# Patient Record
Sex: FEMALE | Race: OTHER | Employment: UNEMPLOYED | URBAN - METROPOLITAN AREA
[De-identification: names, ages, dates, MRNs, and addresses within clinical notes are randomized per-mention and may not be internally consistent; named-entity substitution may affect disease eponyms.]

---

## 2020-07-06 ENCOUNTER — HOSPITAL ENCOUNTER (EMERGENCY)
Age: 30
Discharge: HOME | End: 2020-07-06
Attending: EMERGENCY MEDICINE | Admitting: EMERGENCY MEDICINE
Payer: SELF-PAY

## 2020-07-06 ENCOUNTER — APPOINTMENT (OUTPATIENT)
Dept: GENERAL RADIOLOGY | Age: 30
End: 2020-07-06
Attending: EMERGENCY MEDICINE
Payer: SELF-PAY

## 2020-07-06 VITALS
DIASTOLIC BLOOD PRESSURE: 73 MMHG | SYSTOLIC BLOOD PRESSURE: 121 MMHG | WEIGHT: 162 LBS | HEART RATE: 89 BPM | RESPIRATION RATE: 18 BRPM | OXYGEN SATURATION: 97 % | TEMPERATURE: 98.6 F

## 2020-07-06 DIAGNOSIS — R50.9 ACUTE FEBRILE ILLNESS: Primary | ICD-10-CM

## 2020-07-06 DIAGNOSIS — Z20.822 SUSPECTED COVID-19 VIRUS INFECTION: ICD-10-CM

## 2020-07-06 PROCEDURE — 99282 EMERGENCY DEPT VISIT SF MDM: CPT

## 2020-07-06 PROCEDURE — 71045 X-RAY EXAM CHEST 1 VIEW: CPT

## 2020-07-06 PROCEDURE — 87635 SARS-COV-2 COVID-19 AMP PRB: CPT

## 2020-07-06 PROCEDURE — 74011250637 HC RX REV CODE- 250/637: Performed by: EMERGENCY MEDICINE

## 2020-07-06 RX ORDER — ACETAMINOPHEN 500 MG
1000 TABLET ORAL
Status: COMPLETED | OUTPATIENT
Start: 2020-07-06 | End: 2020-07-06

## 2020-07-06 RX ORDER — ACETAMINOPHEN 500 MG
1000 TABLET ORAL
Qty: 30 TAB | Refills: 0 | Status: SHIPPED | OUTPATIENT
Start: 2020-07-06

## 2020-07-06 RX ORDER — IBUPROFEN 800 MG/1
800 TABLET ORAL
Qty: 30 TAB | Refills: 0 | Status: SHIPPED | OUTPATIENT
Start: 2020-07-06

## 2020-07-06 RX ADMIN — ACETAMINOPHEN 1000 MG: 500 TABLET ORAL at 22:46

## 2020-07-07 ENCOUNTER — PATIENT OUTREACH (OUTPATIENT)
Dept: CASE MANAGEMENT | Age: 30
End: 2020-07-07

## 2020-07-07 NOTE — ED TRIAGE NOTES
[employer]  Used     Patient arrives for complaints of generalized body aches, headache, and cough     Patient took tylenol this morning     Patient unsure if she has had COVID exposure

## 2020-07-07 NOTE — DISCHARGE INSTRUCTIONS
Patient Education        Aprenda sobre el coronavirus ([id number])  Learning About Coronavirus (COVID-19)  Coronavirus ([phone]): Generalidades  ¿Qué es el coronavirus (FESWG-80)? La enfermedad por coronavirus (COVID-19) es causada por un virus. Es bright enfermedad que se detectó por Aloma Callas en [facility], en [place] de 2019. Desde entonces se ha extendido por todo el jennifer. El virus puede causar [place], tos y dificultad para respirar. En casos graves, puede causar neumonía y hacer que sea difícil respirar sin [facility]. Puede causar la muerte. Los coronavirus son un gran rashi de virus. Provocan el resfriado. También causan enfermedades más graves chance el síndrome respiratorio de [place] (MERS, por bela siglas en inglés) y el síndrome respiratorio jolynn grave (SARS, por bela siglas en inglés). COVID-19 es causada por un nuevo coronavirus. [place] significa que es un nuevo tipo que no se había visto antes [facility]. El virus se transmite de persona a persona a través de gotitas por toser y estornudar. También puede transmitirse cuando usted está cerca de bright persona que está infectada. Y puede transmitirse cuando toca algo que contiene el virus, chance el pomo de bright natalia o bright trimble. ¿Qué puede usted hacer para protegerse del coronavirus (PZAWS-69)? La mejor manera de evitar enfermarse es hacer lo siguiente:  · Evitar las áreas en las que haya un brote. · Evitar el contacto con personas que pudieran estar infectadas. · [place] vale a menudo con jabón o desinfectantes de vale a base de alcohol. · [address] y tratar de mantenerse al menos a 6 pies de distancia de otras personas. · [place] vale a menudo, especialmente después de toser o estornudar. Use agua y Cristi, y frótese las vale por al menos 20 segundos. Si no tiene a reich alcance agua y jabón, use un desinfectante de vale a base de alcohol. · Evitar tocarse la boca, la nariz y los ojos.   ¿Qué puede usted hacer para evitar contagiar el virus a otras personas? Para ayudar a evitar la transmisión del virus a otras personas:  · Cúbrase la boca con un pañuelo de papel cuando tosa o estornude. Luego tire el pañuelo a la basura. · Use un desinfectante para limpiar las cosas que toca con frecuencia. · Use bright cubierta facial de natasha si tiene que ir a lugares públicos. · Quédese en casa si está enfermo o se ha expuesto al virus. No vaya a la escuela, al [employer] ni a lugares públicos. Y no use el transporte público, transportes compartidos ni taxis a menos que no tenga otra opción. · Si está enfermo:  ? Salga de reich casa solamente si tiene que obtener [address]. Isidro llame al consultorio médico gunner para que sepan que está por ir. Y use bright cubierta facial.  ? Use la cubierta facial toda vez que esté en presencia de otras personas. Puede ayudar a detener la propagación del virus cuando tose o estornuda. ? Limpie y desinfecte reich hogar todos los días. Use limpiadores domésticos y toallitas o aerosoles desinfectantes. Tenga especial cuidado de limpiar las cosas que sujeta con [facility]. Estas incluyen perillas de verena, controles remotos, teléfonos y manijas del refrigerador y el microondas. Y no olvide las encimeras, [place], almas y teclados de computadora. Cuándo pedir [employer]  en cualquier momento que considere que necesita atención de [place]. Por ejemplo, llame si:  · Tiene grave dificultad para respirar. (No puede hablar en absoluto). · Tiene dolor o presión alberta en el pecho. · Está muy mareado o aturdido. · Está confuso o no puede pensar con claridad. · Tiene un tinte [facility] rosalind y en los labios. · Se desmaya (pierde el conocimiento) o tiene muchas dificultades para despertarse. Llame a reich médico ahora mismo si comienza a presentar síntomas chance:  · Falta de aire. · Jeffrey Ibeth. · Tos.   Si necesita obtener [address] [employer] [facility], llame gunner al [employer] del médico para que le den instrucciones antes de ir. Asegúrese de usar bright cubierta facial para evitar exponer a otras personas al virus. ¿Dónde puede obtener información actualizada? Las siguientes organizaciones de kandice están siguiendo y estudiando riaz virus. Kajal sitios web contienen la información más actualizada. También obtendrá información sobre qué hacer si twan que puede haberse expuesto al virus. · Centros para el Control y la Prevención de Enfermedades de los EE. UU. (CDC, por kajal siglas en inglés): Los [facility] proporcionan noticias actualizadas sobre la enfermedad y consejos para viajeros. El sitio web también le informa cómo prevenir la propagación de la infección. www.cdc.gov  · Organización Mundial de la [place] (OMS): La OMS ofrece información sobre los brotes del virus. La OMS también burton consejos para viajeros. www.who.int  Revisado: 8 mayo, 2020               Versión del contenido: 12.5  © 0908-7209 Healthwise, [employer]. Las instrucciones de cuidado fueron adaptadas bajo licencia por Good [employer] (which disclaims liability or warranty for this information). Si usted tiene [place] afección médica o sobre estas instrucciones, siempre pregunte a reich profesional de kandice. [place], [employer] niega toda garantía o responsabilidad por reich uso de esta información. Patient Education        Aprenda sobre la fiebre  Learning About Fever  ¿Qué es la fiebre? La fiebre es brgiht temperatura corporal ant. Es bright de las [employer] que el cuerpo combate enfermedades. La fiebre indica que el cuerpo está reaccionando a bright infección o a otras enfermedades, tanto leves chance graves. La fiebre es un síntoma, no bright enfermedad en sí misma. La fiebre puede ser bright señal de que usted está enfermo, isidro la mayoría de las fiebres no están causadas por un problema grave. Es posible que usted tenga fiebre con bright enfermedad de rob importancia, chance un resfriado.  Isidro, en ocasiones, bright infección muy grave puede causar poca o nada de fiebre. Es importante considerar otros síntomas, otras afecciones que usted tenga y cómo se siente usted en general. En niños, preste atención a reich comportamiento y a los síntomas de los que se [place]. ¿Cuál es la temperatura normal del cuerpo? Bright temperatura corporal normal es de 98.6°F (37°C), aproximadamente. Algunas personas tienen bright temperatura normal que es un poco más ant o algo más baja que esta. Reich temperatura puede ser un poco más baja en la mañana que más tarde en el día. Podría elevarse cuando hace ejercicio, lleva ropa gruesa, cash un baño caliente o cuando hace calor. Reich temperatura también puede ser diferente dependiendo de cómo la mida. Si mide la temperatura en la boca (oral) o en la axila, puede ser algo más baja que la temperatura central (rectal). ¿Qué es bright temperatura de fiebre? Bright temperatura central de 100.4°F (38°C) o superior se considera fiebre. ¿Qué puede causar la fiebre? La fiebre puede ser causada por:  · Infecciones. Esta es la causa más común de la [place]. Los ejemplos de infecciones que pueden provocar fiebre incluyen la gripe, bright infección de los riñones o la neumonía. · Algunos medicamentos. · Traumatismos o lesiones graves, chance un ataque al corazón, un ataque cerebral, insolación o quemaduras. · Otras afecciones médicas, chance artritis y algunos tipos de cáncer. ¿Cómo puede tratar la fiebre en el hogar? · Pregúntele a reich médico si puede dolores un analgésico (medicamento para el dolor) de venta lashonda, chance acetaminofén (Tylenol), ibuprofeno (Advil, Motrin) o naproxeno (Aleve). Sea alvino con los medicamentos. Nel y siga todas las instrucciones de la [place]. · [place] abundantes líquidos para prevenir la deshidratación. Opte por beber agua y otros líquidos dung sin cafeína hasta que se sienta mejor.  Si tiene bright enfermedad renal, cardíaca o hepática y tiene que restringir los líquidos, hable con reich médico antes de aumentar la cantidad de líquido que bety. La atención de seguimiento es bright parte clave de reich tratamiento y seguridad. Asegúrese de hacer y acudir a todas las citas, y llame a reich médico si está teniendo problemas. También es bright buena idea saber los resultados de bela exámenes y mantener bright lista de los medicamentos que cash. ¿Dónde puede encontrar más información en inglés? Vaya a http://antionette-jay.info/  [employer] Q163 en la búsqueda para aprender más acerca de \"Aprenda sobre la [place]. \"  Revisado: 26 junio, 9992               LMKXLPX del contenido: 12.5  © 1334-3891 Healthwise, Incorporated. Las instrucciones de cuidado fueron adaptadas bajo licencia por Good [employer] [employer] (which disclaims liability or warranty for this information). Si usted tiene [place] afección médica o sobre estas instrucciones, siempre pregunte a reich profesional de kandice. Healthwise, Incorporated niega toda garantía o responsabilidad por reich uso de esta información.

## 2020-07-07 NOTE — ED PROVIDER NOTES
The patient 58-year-old female who presents to the ED with a complaint of fever and chills, dry cough, runny nose and congestion x2 days. The patient also admits to headaches and shortness of breath with coughing. She denies any nausea or vomiting, abdominal pain, diarrhea, constipation, dysuria, hematuria, dizziness, extremity weakness or numbness, sick contact, skin rash, recent travel, prior history of the same. No past medical history on file. No past surgical history on file. No family history on file. Social History     Socioeconomic History    Marital status: SINGLE     Spouse name: Not on file    Number of children: Not on file    Years of education: Not on file    Highest education level: Not on file   Occupational History    Not on file   Social Needs    Financial resource strain: Not on file    Food insecurity     Worry: Not on file     Inability: Not on file    Transportation needs     Medical: Not on file     Non-medical: Not on file   Tobacco Use    Smoking status: Not on file   Substance and Sexual Activity    Alcohol use: Not on file    Drug use: Not on file    Sexual activity: Not on file   Lifestyle    Physical activity     Days per week: Not on file     Minutes per session: Not on file    Stress: Not on file   Relationships    Social connections     Talks on phone: Not on file     Gets together: Not on file     Attends Gnosticism service: Not on file     Active member of club or organization: Not on file     Attends meetings of clubs or organizations: Not on file     Relationship status: Not on file    Intimate partner violence     Fear of current or ex partner: Not on file     Emotionally abused: Not on file     Physically abused: Not on file     Forced sexual activity: Not on file   Other Topics Concern    Not on file   Social History Narrative    Not on file         ALLERGIES: Patient has no known allergies.     Review of Systems    Vitals:    07/06/20 2035   BP: 121/73   Pulse: (!) 108   Resp: 18   Temp: (!) 101.8 °F (38.8 °C)   SpO2: 97%   Weight: 73.5 kg (162 lb)            Physical Exam   CONSTITUTIONAL: Well-appearing; well-nourished; in no apparent distress  HEAD: Normocephalic; atraumatic  EYES: PERRL; EOM intact; conjunctiva and sclera are clear bilaterally. ENT: No rhinorrhea; normal pharynx with no tonsillar hypertrophy; mucous membranes pink/moist, no erythema, no exudate. NECK: Supple; non-tender; no cervical lymphadenopathy  CARD: Normal S1, S2; no murmurs, rubs, or gallops. Regular rate and rhythm. RESP: Normal respiratory effort; breath sounds clear and equal bilaterally; no wheezes, rhonchi, or rales. ABD: Normal bowel sounds; non-distended; non-tender; no palpable organomegaly, no masses, no bruits. Back Exam: Normal inspection; no vertebral point tenderness, no CVA tenderness. Normal range of motion. EXT: Normal ROM in all four extremities; non-tender to palpation; no swelling or deformity; distal pulses are normal, no edema. SKIN: Warm; dry; no rash. NEURO:Alert and oriented x 3, coherent, CELIA-XII grossly intact, sensory and motor are non-focal.        MDM  Number of Diagnoses or Management Options  Acute febrile illness:   Suspected COVID-19 virus infection:   Diagnosis management comments: Assessment: 47-year-old female who presents to the ED with generalized body aches and pain and shortness of breath with coughing suspect COVID-19 infection rule out pneumonia. The patient is healthy and otherwise stable with a nonfocal exam.  She appears well. Plan: Antipyretic/education, reassurance, symptomatic treatment/discharge with outpatient referral/chest x-ray and COVID-19 testing/ Monitor and Reevaluate.          Amount and/or Complexity of Data Reviewed  Clinical lab tests: ordered and reviewed  Tests in the radiology section of CPT®: ordered and reviewed  Tests in the medicine section of CPT®: reviewed and ordered  Discussion of test results with the performing providers: yes  Decide to obtain previous medical records or to obtain history from someone other than the patient: yes  Obtain history from someone other than the patient: yes  Review and summarize past medical records: yes  Discuss the patient with other providers: yes  Independent visualization of images, tracings, or specimens: yes           Procedures    XRAY INTERPRETATION (ED MD)  Chest Xray  No acute process seen. Normal heart size. No bony abnormalities. No infiltrate. Nati Acevedo MD 11:03 PM    Progress Note:   Pt has been reexamined by Hank Garsia MD. Pt is feeling much better. Symptoms have improved. All available results have been reviewed with pt and any available family. Pt understands sx, dx, and tx in ED. Care plan has been outlined and questions have been answered. Pt is ready to go home. Will send home on acute febrile illness and COVID-19 infection instruction. OUtpatient referral with PCP as needed. Written by Hank Garsia MD,11:03 PM    .   .

## 2020-07-08 NOTE — PROGRESS NOTES
07/08/20 Attempted patient outreach for COVID follow up call per protocol. Unable to contact patient, not active on MyChart. Resolving ADDISON episode.  KELSEY

## 2020-07-11 LAB
SARS-COV-2, COV2NT: DETECTED
SOURCE, COVRS: ABNORMAL
SPECIMEN SOURCE, FCOV2M: ABNORMAL

## 2020-07-13 NOTE — PROGRESS NOTES
With [employer] [place] interpretor [phone], Attempted to reach patient.  Message left for call back concerning lab result

## 2020-07-14 NOTE — PROGRESS NOTES
With [employer] [place]ese interpretor (#[id number]),  I called and spoke with patient concerning results. Pt feeling better. Cough improving. No sob.   Questions answered, discussed reasons to return to ER

## 2022-08-23 ENCOUNTER — OFFICE VISIT (OUTPATIENT)
Dept: FAMILY MEDICINE CLINIC | Age: 32
End: 2022-08-23

## 2022-08-23 VITALS
SYSTOLIC BLOOD PRESSURE: 123 MMHG | HEART RATE: 77 BPM | HEIGHT: 64 IN | WEIGHT: 259 LBS | BODY MASS INDEX: 44.22 KG/M2 | OXYGEN SATURATION: 97 % | TEMPERATURE: 97.7 F | DIASTOLIC BLOOD PRESSURE: 63 MMHG

## 2022-08-23 DIAGNOSIS — R51.9 NONINTRACTABLE HEADACHE, UNSPECIFIED CHRONICITY PATTERN, UNSPECIFIED HEADACHE TYPE: ICD-10-CM

## 2022-08-23 DIAGNOSIS — Z00.00 ENCOUNTER FOR SCREENING AND PREVENTATIVE CARE: ICD-10-CM

## 2022-08-23 DIAGNOSIS — H81.13 BENIGN PAROXYSMAL POSITIONAL VERTIGO DUE TO BILATERAL VESTIBULAR DISORDER: Primary | ICD-10-CM

## 2022-08-23 LAB
GLUCOSE POC: NORMAL MG/DL
HGB BLD-MCNC: 12.6 G/DL

## 2022-08-23 PROCEDURE — 85018 HEMOGLOBIN: CPT | Performed by: NURSE PRACTITIONER

## 2022-08-23 PROCEDURE — 82962 GLUCOSE BLOOD TEST: CPT | Performed by: NURSE PRACTITIONER

## 2022-08-23 PROCEDURE — 99202 OFFICE O/P NEW SF 15 MIN: CPT | Performed by: NURSE PRACTITIONER

## 2022-08-23 RX ORDER — MECLIZINE HYDROCHLORIDE 25 MG/1
25 TABLET ORAL
Qty: 30 TABLET | Refills: 0 | Status: SHIPPED | OUTPATIENT
Start: 2022-08-23 | End: 2022-09-02

## 2022-08-23 NOTE — PROGRESS NOTES
2022 : Evelia Foss (: 1990) is a 27 y.o. female,  new patient, here for evaluation of the following chief complaint(s): Other (Please check depression screening), Dizziness (X 2-3 weeks), and Establish Care     ASSESSMENT/PLAN:  Below is the assessment and plan developed based on review of pertinent history, physical exam, labs, studies, and medications. 1. Benign paroxysmal positional vertigo due to bilateral vestibular disorder  -     meclizine (ANTIVERT) 25 mg tablet; Take 1 Tablet by mouth three (3) times daily as needed for Dizziness for up to 10 days. , Normal, Disp-30 Tablet, R-0  2. Encounter for screening and preventative care  -     AMB POC HEMOGLOBIN (HGB)  -     AMB POC GLUCOSE BLOOD, BY GLUCOSE MONITORING DEVICE  3. Nonintractable headache, unspecified chronicity pattern, unspecified headache type  Exercises for vertigo provided. Return for F2F 1 month LK. SUBJECTIVE/OBJECTIVE:  HPI   Dizziness  2 weeks a lot of pain in the stomach and a lot of pain in the head. Dizziness - like she might fall over. When overcome with emotion or when she is driving or when cooking - needs to sit down or lie down which helps. Then it starts going away. Happens when she gets up in the morning. She'll stay seated for a while. Hearing not affected. Denies cold symptoms. Takes Advil or Tylenol as needed. No headache right now. She has had blurry vision and dizziness. Blurry - not with dizziness but eyes are crying and she can't see. She can't find a comfortable place close or far away to read. Abdominal pain  1.5 weeks. Not related to food. It is not nausea, it is pain. Normal urination. No surgeries. 3 vaginal births. Closing her eyes helps.     Results for orders placed or performed in visit on 22   AMB POC HEMOGLOBIN (HGB)   Result Value Ref Range    Hemoglobin (POC) 12.6 G/DL   AMB POC GLUCOSE BLOOD, BY GLUCOSE MONITORING DEVICE   Result Value Ref Range    Glucose -nf MG/DL   This February will be the 5 years for her arm implant. It doesn't matter what side her head turns to, the room is spinning around her. Review of Systems: Negative for: fever, chest pain, shortness of breath, leg swelling. Social History:  reports that she has never smoked. She has never used smokeless tobacco. She reports that she does not currently use alcohol. She reports that she does not use drugs. Current Medications:   Current Outpatient Medications   Medication Sig    meclizine (ANTIVERT) 25 mg tablet Take 1 Tablet by mouth three (3) times daily as needed for Dizziness for up to 10 days. ibuprofen (MOTRIN) 800 mg tablet Take 1 Tab by mouth every six (6) hours as needed for Pain. acetaminophen (TYLENOL) 500 mg tablet Take 2 Tabs by mouth every six (6) hours as needed for Pain. She has had the headaches in the past but not with the dizziness. Physical Examination: No LMP recorded. Patient has had an implant. Blood pressure 123/63, pulse 77, temperature 97.7 °F (36.5 °C), temperature source Temporal, height 5' 4.17\" (1.63 m), weight 259 lb (117.5 kg), SpO2 97 %. General appearance - well developed, no acute distress. Chest - clear to auscultation. Heart - regular rate and rhythm without murmurs, rubs, or gallops. Abdomen - bowel sounds present x 4, NT, ND  Extremities - no CCE.  + Hallpike nancy bilaterally. An electronic signature was used to authenticate this note.   -- Nely Lutz NP

## 2022-08-23 NOTE — PROGRESS NOTES
Oral Flynn  1990  Chief Complaint   Patient presents with    Other     Please check depression screening    Dizziness     X 2-3 weeks    Establish Care     Visit Vitals  /63 (BP 1 Location: Left upper arm, BP Patient Position: Sitting)   Pulse 77   Temp 97.7 °F (36.5 °C) (Temporal)   Ht 5' 4.17\" (1.63 m)   Wt 259 lb (117.5 kg)   SpO2 97%   BMI 44.22 kg/m²     Results for orders placed or performed in visit on 08/23/22   AMB POC HEMOGLOBIN (HGB)   Result Value Ref Range    Hemoglobin (POC) 12.6 G/DL   AMB POC GLUCOSE BLOOD, BY GLUCOSE MONITORING DEVICE   Result Value Ref Range    Glucose -nf MG/DL

## 2024-02-23 ENCOUNTER — APPOINTMENT (OUTPATIENT)
Facility: HOSPITAL | Age: 34
End: 2024-02-23

## 2024-02-23 ENCOUNTER — HOSPITAL ENCOUNTER (EMERGENCY)
Facility: HOSPITAL | Age: 34
Discharge: HOME | End: 2024-02-24
Attending: EMERGENCY MEDICINE

## 2024-02-23 DIAGNOSIS — O30.001 TWIN GESTATION IN FIRST TRIMESTER, UNSPECIFIED MULTIPLE GESTATION TYPE: ICD-10-CM

## 2024-02-23 DIAGNOSIS — N83.202 CYSTS OF BOTH OVARIES: ICD-10-CM

## 2024-02-23 DIAGNOSIS — R10.32 ABDOMINAL PAIN, LEFT LOWER QUADRANT: Primary | ICD-10-CM

## 2024-02-23 DIAGNOSIS — N83.201 CYSTS OF BOTH OVARIES: ICD-10-CM

## 2024-02-23 DIAGNOSIS — N83.202 HEMORRHAGIC CYST OF LEFT OVARY: ICD-10-CM

## 2024-02-23 LAB
ALBUMIN SERPL-MCNC: 4.5 G/DL (ref 3.5–5.2)
ALBUMIN/GLOB SERPL: 1.3 (ref 1.1–2.2)
ALP SERPL-CCNC: 115 U/L (ref 35–104)
ALT SERPL-CCNC: 35 U/L (ref 10–35)
ANION GAP SERPL CALC-SCNC: 13 MMOL/L (ref 5–15)
APPEARANCE UR: CLEAR
AST SERPL-CCNC: 26 U/L (ref 10–35)
BACTERIA URNS QL MICRO: NEGATIVE /HPF
BASOPHILS # BLD: 0 K/UL (ref 0–1)
BASOPHILS NFR BLD: 1 % (ref 0–1)
BILIRUB SERPL-MCNC: 0.3 MG/DL (ref 0.2–1)
BILIRUB UR QL: NEGATIVE
BUN SERPL-MCNC: 10 MG/DL (ref 6–20)
BUN/CREAT SERPL: 16 (ref 12–20)
CALCIUM SERPL-MCNC: 9.2 MG/DL (ref 8.6–10)
CHLORIDE SERPL-SCNC: 101 MMOL/L (ref 98–107)
CO2 SERPL-SCNC: 24 MMOL/L (ref 22–29)
COLOR UR: ABNORMAL
COMMENT:: NORMAL
CREAT SERPL-MCNC: 0.63 MG/DL (ref 0.5–0.9)
DIFFERENTIAL METHOD BLD: ABNORMAL
EOSINOPHIL # BLD: 0.2 K/UL (ref 0–0.4)
EOSINOPHIL NFR BLD: 2 %
EPITH CASTS URNS QL MICRO: ABNORMAL /LPF
ERYTHROCYTE [DISTWIDTH] IN BLOOD BY AUTOMATED COUNT: 12.8 % (ref 11.5–14.5)
GLOBULIN SER CALC-MCNC: 3.5 G/DL (ref 2–4)
GLUCOSE SERPL-MCNC: 96 MG/DL (ref 65–100)
GLUCOSE UR STRIP.AUTO-MCNC: NEGATIVE MG/DL
HCG SERPL-ACNC: ABNORMAL MIU/ML
HCG UR QL: POSITIVE
HCT VFR BLD AUTO: 37.9 % (ref 35–47)
HGB BLD-MCNC: 12.5 G/DL (ref 11.5–16)
HGB UR QL STRIP: ABNORMAL
IMM GRANULOCYTES # BLD AUTO: 0 K/UL (ref 0–0.04)
IMM GRANULOCYTES NFR BLD AUTO: 0 % (ref 0–0.5)
KETONES UR QL STRIP.AUTO: NEGATIVE MG/DL
LEUKOCYTE ESTERASE UR QL STRIP.AUTO: NEGATIVE
LYMPHOCYTES # BLD: 2.1 K/UL (ref 0.8–3.5)
LYMPHOCYTES NFR BLD: 27 % (ref 12–49)
MCH RBC QN AUTO: 28.2 PG (ref 26–34)
MCHC RBC AUTO-ENTMCNC: 33 G/DL (ref 30–36.5)
MCV RBC AUTO: 85.6 FL (ref 80–99)
MONOCYTES # BLD: 0.4 K/UL (ref 0–1)
MONOCYTES NFR BLD: 6 % (ref 5–13)
NEUTS SEG # BLD: 5.1 K/UL (ref 1.8–8)
NEUTS SEG NFR BLD: 64 % (ref 32–75)
NITRITE UR QL STRIP.AUTO: NEGATIVE
NRBC # BLD: 0 K/UL (ref 0–0.01)
NRBC BLD-RTO: 0 PER 100 WBC
PH UR STRIP: 6 (ref 5–8)
PLATELET # BLD AUTO: 250 K/UL (ref 150–400)
PMV BLD AUTO: 11 FL (ref 8.9–12.9)
POTASSIUM SERPL-SCNC: 4.4 MMOL/L (ref 3.5–5.1)
PROT SERPL-MCNC: 8 G/DL (ref 6.4–8.3)
PROT UR STRIP-MCNC: NEGATIVE MG/DL
RBC # BLD AUTO: 4.43 M/UL (ref 3.8–5.2)
RBC #/AREA URNS HPF: ABNORMAL /HPF
SODIUM SERPL-SCNC: 138 MMOL/L (ref 136–145)
SP GR UR REFRACTOMETRY: 1.01 (ref 1–1.03)
SPECIMEN HOLD: NORMAL
SPECIMEN HOLD: NORMAL
UROBILINOGEN UR QL STRIP.AUTO: 0.2 EU/DL (ref 0.2–1)
WBC # BLD AUTO: 7.9 K/UL (ref 3.6–11)
WBC URNS QL MICRO: ABNORMAL /HPF (ref 0–4)

## 2024-02-23 PROCEDURE — 81001 URINALYSIS AUTO W/SCOPE: CPT

## 2024-02-23 PROCEDURE — 76817 TRANSVAGINAL US OBSTETRIC: CPT

## 2024-02-23 PROCEDURE — 36415 COLL VENOUS BLD VENIPUNCTURE: CPT

## 2024-02-23 PROCEDURE — 76801 OB US < 14 WKS SINGLE FETUS: CPT

## 2024-02-23 PROCEDURE — 6370000000 HC RX 637 (ALT 250 FOR IP): Performed by: EMERGENCY MEDICINE

## 2024-02-23 PROCEDURE — 99284 EMERGENCY DEPT VISIT MOD MDM: CPT

## 2024-02-23 PROCEDURE — 2580000003 HC RX 258: Performed by: EMERGENCY MEDICINE

## 2024-02-23 PROCEDURE — 86901 BLOOD TYPING SEROLOGIC RH(D): CPT

## 2024-02-23 PROCEDURE — 85025 COMPLETE CBC W/AUTO DIFF WBC: CPT

## 2024-02-23 PROCEDURE — 81025 URINE PREGNANCY TEST: CPT

## 2024-02-23 PROCEDURE — 84702 CHORIONIC GONADOTROPIN TEST: CPT

## 2024-02-23 PROCEDURE — 86900 BLOOD TYPING SEROLOGIC ABO: CPT

## 2024-02-23 PROCEDURE — 80053 COMPREHEN METABOLIC PANEL: CPT

## 2024-02-23 RX ORDER — 0.9 % SODIUM CHLORIDE 0.9 %
1000 INTRAVENOUS SOLUTION INTRAVENOUS ONCE
Status: COMPLETED | OUTPATIENT
Start: 2024-02-23 | End: 2024-02-24

## 2024-02-23 RX ORDER — ACETAMINOPHEN 325 MG/1
650 TABLET ORAL
Status: COMPLETED | OUTPATIENT
Start: 2024-02-23 | End: 2024-02-23

## 2024-02-23 RX ADMIN — ACETAMINOPHEN 650 MG: 325 TABLET ORAL at 21:03

## 2024-02-23 RX ADMIN — SODIUM CHLORIDE 1000 ML: 9 INJECTION, SOLUTION INTRAVENOUS at 21:40

## 2024-02-23 ASSESSMENT — ENCOUNTER SYMPTOMS: ABDOMINAL PAIN: 1

## 2024-02-23 NOTE — ED TRIAGE NOTES
Triage completed with  #[id number].  Pt reports abdominal pain mostly left sided x 1 week.  Pt reports a positive pregnancy test on 2/7/24, she states she had one episode of vaginal bleeding.   Denies nausea/vomiting/ diarrhea.   G4,p3

## 2024-02-24 VITALS
SYSTOLIC BLOOD PRESSURE: 116 MMHG | WEIGHT: 266.76 LBS | TEMPERATURE: 98.2 F | HEART RATE: 82 BPM | BODY MASS INDEX: 52.37 KG/M2 | OXYGEN SATURATION: 99 % | DIASTOLIC BLOOD PRESSURE: 63 MMHG | RESPIRATION RATE: 17 BRPM | HEIGHT: 60 IN

## 2024-02-24 LAB
ABO + RH BLD: NORMAL
BLOOD BANK CMNT PATIENT-IMP: NORMAL

## 2024-02-24 NOTE — ED PROVIDER NOTES
[facility] EMERGENCY DEPT  EMERGENCY DEPARTMENT ENCOUNTER      Pt Name: Jalyn Granado  MRN: [id number]  Birthdate 1990  Date of evaluation: 2/23/2024  Provider: Randal Emerson MD    CHIEF COMPLAINT       Chief Complaint   Patient presents with    Abdominal Pain         HISTORY OF PRESENT ILLNESS    This is a 33-year-old female who denies any chronic medical problems presented to ER for evaluation of left lower quadrant pain has been going for the past week.  Patient reports that 8 days ago she had some vaginal bleeding.  She felt she was pregnant on the seventh of this month.  She reports the bleeding has been 1 time and since then has not had any more bleeding.  Patient reports it struck the inside of her underwear and was there when she wiped but it has not had any more since then.  When her last period was.  She has not been getting regular periods and she does feel tired when discharged to test on the seventh and found she was pregnant.  She has an appointment with GYN established for the first of the upcoming month.  Patient is G4, P3.  Reports no specific complications during the prior 3 pregnancies aside from significant swelling in her lower legs.    Otherwise no chronic medical problems, denies any allergies to medications alcohol smoking or drugs            Review of External Medical Records:     Nursing Notes were reviewed.    REVIEW OF SYSTEMS       Review of Systems   Constitutional:  Negative for fever.   Gastrointestinal:  Positive for abdominal pain.   Genitourinary:  Positive for vaginal bleeding.       Except as noted above the remainder of the review of systems was reviewed and negative.       PAST MEDICAL HISTORY   History reviewed. No pertinent past medical history.      SURGICAL HISTORY     History reviewed. No pertinent surgical history.      CURRENT MEDICATIONS       Discharge Medication List as of 2/24/2024 12:48 AM        CONTINUE these medications which have NOT CHANGED     Twin gestation in first trimester, unspecified multiple gestation type          DISPOSITION/PLAN   DISPOSITION Decision To Discharge 02/24/2024 12:46:54 AM      PATIENT REFERRED TO:  YOUR GYN    Go to   KEEP YOUR GYN APPOINTMENT      DISCHARGE MEDICATIONS:  Discharge Medication List as of 2/24/2024 12:48 AM            (Please note that portions of this note were completed with a voice recognition program.  Efforts were made to edit the dictations but occasionally words are mis-transcribed.)    Randal Emerson MD (electronically signed)  Emergency Medicine Attending Physician             Randal Emerson MD  02/24/24 [id number]

## 2024-08-04 ENCOUNTER — HOSPITAL ENCOUNTER (EMERGENCY)
Facility: HOSPITAL | Age: 34
Discharge: ANOTHER HOSPITAL | End: 2024-08-04
Attending: STUDENT IN AN ORGANIZED HEALTH CARE EDUCATION/TRAINING PROGRAM
Payer: MEDICAID

## 2024-08-04 VITALS
TEMPERATURE: 98.3 F | BODY MASS INDEX: 41.38 KG/M2 | HEART RATE: 96 BPM | SYSTOLIC BLOOD PRESSURE: 109 MMHG | DIASTOLIC BLOOD PRESSURE: 74 MMHG | HEIGHT: 67 IN | OXYGEN SATURATION: 97 % | RESPIRATION RATE: 18 BRPM

## 2024-08-04 DIAGNOSIS — O36.8132 DECREASED FETAL MOVEMENTS IN THIRD TRIMESTER, FETUS 2 OF MULTIPLE GESTATION: ICD-10-CM

## 2024-08-04 DIAGNOSIS — O26.899 ABDOMINAL PAIN AFFECTING PREGNANCY: Primary | ICD-10-CM

## 2024-08-04 DIAGNOSIS — R10.9 ABDOMINAL PAIN AFFECTING PREGNANCY: Primary | ICD-10-CM

## 2024-08-04 DIAGNOSIS — M54.50 ACUTE RIGHT-SIDED LOW BACK PAIN WITHOUT SCIATICA: ICD-10-CM

## 2024-08-04 LAB
BASOPHILS # BLD: 0 K/UL (ref 0–1)
BASOPHILS NFR BLD: 0 % (ref 0–1)
DIFFERENTIAL METHOD BLD: ABNORMAL
EOSINOPHIL # BLD: 0.1 K/UL (ref 0–0.4)
EOSINOPHIL NFR BLD: 1 %
ERYTHROCYTE [DISTWIDTH] IN BLOOD BY AUTOMATED COUNT: 13.3 % (ref 11.5–14.5)
HCT VFR BLD AUTO: 33 % (ref 35–47)
HGB BLD-MCNC: 10.9 G/DL (ref 11.5–16)
IMM GRANULOCYTES NFR BLD AUTO: 1 % (ref 0–0.5)
LYMPHOCYTES # BLD: 1.5 K/UL (ref 0.8–3.5)
LYMPHOCYTES NFR BLD: 20 % (ref 12–49)
MCH RBC QN AUTO: 27.9 PG (ref 26–34)
MCHC RBC AUTO-ENTMCNC: 33 G/DL (ref 30–36.5)
MCV RBC AUTO: 84.4 FL (ref 80–99)
MONOCYTES # BLD: 0.4 K/UL (ref 0–1)
MONOCYTES NFR BLD: 5 % (ref 5–13)
NEUTS SEG # BLD: 5.5 K/UL (ref 1.8–8)
NEUTS SEG NFR BLD: 73 % (ref 32–75)
NRBC # BLD: 0 K/UL (ref 0–0.01)
NRBC BLD-RTO: 0 PER 100 WBC
PLATELET # BLD AUTO: 207 K/UL (ref 150–400)
PMV BLD AUTO: 11.5 FL (ref 8.9–12.9)
RBC # BLD AUTO: 3.91 M/UL (ref 3.8–5.2)
WBC # BLD AUTO: 7.6 K/UL (ref 3.6–11)

## 2024-08-04 PROCEDURE — 2580000003 HC RX 258: Performed by: STUDENT IN AN ORGANIZED HEALTH CARE EDUCATION/TRAINING PROGRAM

## 2024-08-04 PROCEDURE — 99285 EMERGENCY DEPT VISIT HI MDM: CPT

## 2024-08-04 PROCEDURE — 36415 COLL VENOUS BLD VENIPUNCTURE: CPT

## 2024-08-04 PROCEDURE — 85025 COMPLETE CBC W/AUTO DIFF WBC: CPT

## 2024-08-04 RX ORDER — SODIUM CHLORIDE, SODIUM LACTATE, POTASSIUM CHLORIDE, AND CALCIUM CHLORIDE .6; .31; .03; .02 G/100ML; G/100ML; G/100ML; G/100ML
1000 INJECTION, SOLUTION INTRAVENOUS ONCE
Status: COMPLETED | OUTPATIENT
Start: 2024-08-04 | End: 2024-08-04

## 2024-08-04 RX ADMIN — SODIUM CHLORIDE, POTASSIUM CHLORIDE, SODIUM LACTATE AND CALCIUM CHLORIDE 1000 ML: 600; 310; 30; 20 INJECTION, SOLUTION INTRAVENOUS at 11:16

## 2024-08-04 ASSESSMENT — PAIN DESCRIPTION - DESCRIPTORS
DESCRIPTORS: ACHING
DESCRIPTORS: ACHING

## 2024-08-04 ASSESSMENT — PAIN - FUNCTIONAL ASSESSMENT
PAIN_FUNCTIONAL_ASSESSMENT: 0-10
PAIN_FUNCTIONAL_ASSESSMENT: 0-10

## 2024-08-04 ASSESSMENT — PAIN DESCRIPTION - LOCATION
LOCATION: ABDOMEN
LOCATION: ABDOMEN

## 2024-08-04 ASSESSMENT — PAIN DESCRIPTION - PAIN TYPE: TYPE: ACUTE PAIN

## 2024-08-04 ASSESSMENT — PAIN SCALES - GENERAL
PAINLEVEL_OUTOF10: 6
PAINLEVEL_OUTOF10: 6

## 2024-08-04 ASSESSMENT — PAIN DESCRIPTION - FREQUENCY: FREQUENCY: CONTINUOUS

## 2024-08-04 ASSESSMENT — PAIN DESCRIPTION - ORIENTATION
ORIENTATION: LOWER
ORIENTATION: LOWER

## 2024-08-04 NOTE — ED TRIAGE NOTES
30w nayla twin pregnant pt ambulatory into ER with steady gait, accompanied by FOB. Pt reports cc of intermittent, lower abdominal and hip pain since yesterday. Pt also reports decreased FM since yesterday. Pt denies vaginal bleeding or LOF. Pt's abdomen palpates soft.     Pt denies use of OTC medication PTA.     Pt reports hx of GDM- pt denies use of insulin.     Pt reports last eating at 1900 yesterday evening. Pt reports only PO fluids thus far.

## 2024-08-04 NOTE — ED PROVIDER NOTES
HPI    34 y.o. female G4, para 3 (3 live births) history of gestational diabetes presenting with abdominal pain and low back pain.  She states that it started yesterday, she states that it is in the midline and has been intermittent since yesterday.  She states it lasted for several hours yesterday.  She states that it feels somewhat different than contractions that she has had during her previous labor.  She also states that she has not been able to to feel any fetal movement on the right today.  She thought that she was able to perceive some slight fetal movement on the left side of her abdomen but not as vigorous as in the past.  States that her blood glucose was in the 100's today.  She follows at [facility] with maternal-fetal medicine, sees Dr. Handley,   Jalyn Keller Loy   has no past medical history on file.       Physical Exam  Vitals reviewed.   Constitutional:       General: She is not in acute distress.     Appearance: Normal appearance.   HENT:      Head: Normocephalic.      Mouth/Throat:      Mouth: Mucous membranes are moist.   Eyes:      Extraocular Movements: Extraocular movements intact.      Pupils: Pupils are equal, round, and reactive to light.   Cardiovascular:      Rate and Rhythm: Regular rhythm. Tachycardia present.      Pulses: Normal pulses.   Pulmonary:      Effort: Pulmonary effort is normal. No respiratory distress.   Abdominal:      General: Abdomen is flat.      Comments: Gravid abdomen, palpable fetal parts   Musculoskeletal:      Cervical back: Neck supple. No rigidity.      Right lower leg: No edema.      Left lower leg: No edema.   Skin:     General: Skin is warm.      Capillary Refill: Capillary refill takes less than 2 seconds.   Neurological:      General: No focal deficit present.      Mental Status: She is alert. Mental status is at baseline.   Psychiatric:         Mood and Affect: Mood normal.           LABORATORY TESTS:  Labs Reviewed   CBC WITH AUTO DIFFERENTIAL - Abnormal;

## 2024-08-04 NOTE — ED NOTES
TRANSFER - OUT REPORT:    Verbal report given to Shavon Burkett RN on Jalyn Granado  being transferred to [facility] L&D for urgent transfer       Report consisted of patient's Situation, Background, Assessment and   Recommendations(SBAR).     Information from the following report(s) Nurse Handoff Report, ED SBAR, Intake/Output, and MAR was reviewed with the receiving nurse.    [place] Fall Assessment:                           Lines:   Peripheral IV 08/04/24 Left;Posterior Hand (Active)   Site Assessment Clean, dry & intact 08/04/24 1104   Line Status Blood return noted 08/04/24 1104   Line Care Cap changed 08/04/24 1104   Infiltration Assessment 0 08/04/24 1104   Dressing Status Clean, dry & intact 08/04/24 1104   Dressing Type Transparent 08/04/24 1104        Opportunity for questions and clarification was provided.      Patient transported with:  Monitor via [facility]

## 2024-08-04 NOTE — ED NOTES
Pt CHRISTAL via stretcher by [facility] to [facility] L&D.